# Patient Record
Sex: MALE | URBAN - METROPOLITAN AREA
[De-identification: names, ages, dates, MRNs, and addresses within clinical notes are randomized per-mention and may not be internally consistent; named-entity substitution may affect disease eponyms.]

---

## 2022-08-02 ENCOUNTER — TELEPHONE (OUTPATIENT)
Dept: HEMATOLOGY/ONCOLOGY | Facility: CLINIC | Age: 67
End: 2022-08-02
Payer: MEDICARE

## 2022-08-02 NOTE — TELEPHONE ENCOUNTER
----- Message from Lc Astudillo sent at 8/2/2022 12:03 PM CDT -----  Regarding: New BMT referral Dx; MDS Tx eval  Dr. Turner ( [facility])  Records scanned into Media

## 2022-08-05 ENCOUNTER — TELEPHONE (OUTPATIENT)
Dept: HEMATOLOGY/ONCOLOGY | Facility: CLINIC | Age: 67
End: 2022-08-05
Payer: MEDICARE

## 2022-08-05 NOTE — TELEPHONE ENCOUNTER
DENZEL Segura explained the below to the patient and in a vm to Dr Michael MAHONEY. Pt recommended to reach out to his insurance to inquire which BMT facilities they cover.      Dr James reviewed the patient's case; recommended the patient would most benefit from seeking care in a facility that is within network with his insurance.        ___________________________________    ----- Message from Shadi Albrecht sent at 8/5/2022 11:23 AM CDT -----  Regarding: RE: SCT benefits check  Hey Sabrina,    No I did call again and spoke to a difference rep who stated that patient needs to call for that information.    Evelio Hsu  ----- Message -----  From: Sabrina Fleming RN  Sent: 8/5/2022  10:13 AM CDT  To: Shadi Albrecht  Subject: RE: SCT benefits check                           Hey Dougo,     Just checking in one more time before I speak w the referring clinic - any updates?  ----- Message -----  From: Shadi Albrecht  Sent: 8/2/2022   1:49 PM CDT  To: Sabrina Fleming RN  Subject: RE: SCT benefits check                           Hey Sabrina  I called [facility] to obtain information of what facility that will be in network for patient to be seen both medical and transplant. Per rep, they are not allow to give that information to provider.    Provider can obtain that information from [facility] website or patient can call to inquiry.    I will try to call again and speak to another rep to see if I can obtain the information    Shadi  ----- Message -----  From: Sabrina Fleming RN  Sent: 8/2/2022   1:25 PM CDT  To: Shadi Albrecht  Subject: RE: SCT benefits check                           Thank you!  ----- Message -----  From: Shadi Albrecht  Sent: 8/2/2022   1:20 PM CDT  To: Sabrina Fleming RN  Subject: RE: SCT benefits check                           No they did not but will obtain the information  Shadi  ----- Message -----  From: Sabrina Fleming RN  Sent: 8/2/2022   1:18 PM CDT  To: Shadi Albrecht  Subject: RE: SCT benefits check                            Oziel Hsu, did the insurance mention other facilities for SCT? Dr. James inquiring    ----- Message -----  From: Shadi Albrecht  Sent: 8/2/2022  12:28 PM CDT  To: Sabrina Fleming, DENZEL  Subject: RE: SCT benefits check                           Good afternoon Sabrina,    Patient;s insurance coverage is out of network for medical and must obtain [employer] case for transplant services.    Thank you  Shadi  ----- Message -----  From: Sabrina Fleming RN  Sent: 8/2/2022  12:05 PM CDT  To: Yamila Botello, Shadi Albrecht  Subject: SCT benefits check                               Hello,    Please complete benefit check for patient. Medical benefit check and transplant benefit check for Roddy Moran only.    Allo    Thank you,  Sabrina